# Patient Record
Sex: FEMALE | Race: WHITE | NOT HISPANIC OR LATINO | Employment: OTHER | URBAN - METROPOLITAN AREA
[De-identification: names, ages, dates, MRNs, and addresses within clinical notes are randomized per-mention and may not be internally consistent; named-entity substitution may affect disease eponyms.]

---

## 2021-09-02 LAB — RETINOPATHY: NORMAL

## 2021-12-12 ENCOUNTER — TRANSFERRED RECORDS (OUTPATIENT)
Dept: HEALTH INFORMATION MANAGEMENT | Facility: CLINIC | Age: 86
End: 2021-12-12

## 2022-01-03 ENCOUNTER — TRANSFERRED RECORDS (OUTPATIENT)
Dept: HEALTH INFORMATION MANAGEMENT | Facility: CLINIC | Age: 87
End: 2022-01-03

## 2022-04-07 ENCOUNTER — TRANSFERRED RECORDS (OUTPATIENT)
Dept: HEALTH INFORMATION MANAGEMENT | Facility: CLINIC | Age: 87
End: 2022-04-07

## 2022-04-07 LAB
ALT SERPL-CCNC: 50 U/L (ref 6–29)
AST SERPL-CCNC: 28 U/L (ref 10–35)
CHOLESTEROL (EXTERNAL): 168 MG/DL
CREATININE (EXTERNAL): 1.24 MG/DL (ref 0.6–0.88)
GFR ESTIMATED (EXTERNAL): 39 ML/MIN/1.73M2
GFR ESTIMATED (IF AFRICAN AMERICAN) (EXTERNAL): 46 ML/MIN/1.73M2
GLUCOSE (EXTERNAL): 155 MG/DL (ref 65–99)
HDLC SERPL-MCNC: 59 MG/DL
LDL CHOLESTEROL (EXTERNAL): 90 MG/DL
NON HDL CHOLESTEROL (EXTERNAL): 109 MG/DL
POTASSIUM (EXTERNAL): 4.9 MMOL/L (ref 3.5–5.3)
TRIGLYCERIDES (EXTERNAL): 94 MG/DL

## 2022-07-19 ENCOUNTER — TRANSFERRED RECORDS (OUTPATIENT)
Dept: HEALTH INFORMATION MANAGEMENT | Facility: CLINIC | Age: 87
End: 2022-07-19

## 2022-10-20 ENCOUNTER — TRANSFERRED RECORDS (OUTPATIENT)
Dept: HEALTH INFORMATION MANAGEMENT | Facility: CLINIC | Age: 87
End: 2022-10-20

## 2022-10-26 ENCOUNTER — OFFICE VISIT (OUTPATIENT)
Dept: FAMILY MEDICINE | Facility: CLINIC | Age: 87
End: 2022-10-26
Payer: COMMERCIAL

## 2022-10-26 VITALS
SYSTOLIC BLOOD PRESSURE: 151 MMHG | DIASTOLIC BLOOD PRESSURE: 72 MMHG | HEART RATE: 73 BPM | WEIGHT: 112 LBS | OXYGEN SATURATION: 97 % | RESPIRATION RATE: 14 BRPM

## 2022-10-26 DIAGNOSIS — E11.65 TYPE 2 DIABETES MELLITUS WITH HYPERGLYCEMIA, WITHOUT LONG-TERM CURRENT USE OF INSULIN (H): ICD-10-CM

## 2022-10-26 DIAGNOSIS — I65.23 BILATERAL CAROTID ARTERY STENOSIS: Primary | ICD-10-CM

## 2022-10-26 DIAGNOSIS — F41.1 GENERALIZED ANXIETY DISORDER: ICD-10-CM

## 2022-10-26 DIAGNOSIS — I50.9 CHRONIC CONGESTIVE HEART FAILURE, UNSPECIFIED HEART FAILURE TYPE (H): ICD-10-CM

## 2022-10-26 PROBLEM — F41.9 ANXIETY DISORDER: Status: ACTIVE | Noted: 2022-01-31

## 2022-10-26 PROBLEM — I10 ESSENTIAL HYPERTENSION: Status: ACTIVE | Noted: 2022-10-26

## 2022-10-26 PROCEDURE — 99204 OFFICE O/P NEW MOD 45 MIN: CPT | Performed by: INTERNAL MEDICINE

## 2022-10-26 RX ORDER — LISINOPRIL 40 MG/1
40 TABLET ORAL DAILY
Qty: 90 TABLET | Refills: 1 | Status: SHIPPED | OUTPATIENT
Start: 2022-10-26 | End: 2022-12-12

## 2022-10-26 RX ORDER — METOPROLOL SUCCINATE 50 MG/1
TABLET, EXTENDED RELEASE ORAL
COMMUNITY
Start: 2021-05-05

## 2022-10-26 RX ORDER — LIRAGLUTIDE 6 MG/ML
1.2 INJECTION SUBCUTANEOUS DAILY
COMMUNITY
Start: 2022-10-26

## 2022-10-26 RX ORDER — ATORVASTATIN CALCIUM 10 MG/1
TABLET, FILM COATED ORAL
COMMUNITY
Start: 2022-09-20 | End: 2022-10-26

## 2022-10-26 RX ORDER — LISINOPRIL 40 MG/1
TABLET ORAL
COMMUNITY
End: 2022-10-26

## 2022-10-26 RX ORDER — SERTRALINE HYDROCHLORIDE 25 MG/1
TABLET, FILM COATED ORAL
COMMUNITY
End: 2022-10-26

## 2022-10-26 RX ORDER — ATORVASTATIN CALCIUM 20 MG/1
20 TABLET, FILM COATED ORAL AT BEDTIME
Qty: 90 TABLET | Refills: 1 | Status: SHIPPED | OUTPATIENT
Start: 2022-10-26 | End: 2023-08-14

## 2022-10-26 RX ORDER — LIRAGLUTIDE 6 MG/ML
INJECTION SUBCUTANEOUS
COMMUNITY
Start: 2022-10-20 | End: 2022-10-26

## 2022-10-26 RX ORDER — SERTRALINE HYDROCHLORIDE 25 MG/1
25 TABLET, FILM COATED ORAL DAILY
Qty: 90 TABLET | Refills: 1 | Status: SHIPPED | OUTPATIENT
Start: 2022-10-26 | End: 2023-05-22

## 2022-10-26 RX ORDER — CLOPIDOGREL BISULFATE 75 MG/1
TABLET ORAL
COMMUNITY
Start: 2021-06-04

## 2022-10-26 RX ORDER — LATANOPROST 50 UG/ML
SOLUTION/ DROPS OPHTHALMIC
COMMUNITY
Start: 2022-10-08

## 2022-10-26 ASSESSMENT — PAIN SCALES - GENERAL: PAINLEVEL: NO PAIN (0)

## 2022-10-26 NOTE — PATIENT INSTRUCTIONS
Carotid Ultrasound:    We have ordered an imaging study.  Unless otherwise indicated, please call the number below to schedule.   [facility] Scheduling:    [phone]    Results will be conveyed by MyChart, telephone or letter.

## 2022-10-26 NOTE — PROGRESS NOTES
Assessment & Plan     Bilateral carotid artery stenosis  She had L CEA in ? Does not recall any surveillance thereafter.  We will obtain an ultrasound.  - US Carotid Bilateral  - atorvastatin (LIPITOR) 20 MG tablet  Dispense: 90 tablet; Refill: 1    Type 2 diabetes mellitus with hyperglycemia, without long-term current use of insulin (H)  She is on a regimen of Victoza and Jardiance.  She had been on metformin and glimepiride at some point.  After discussion we understood that she actually has recently established with a local endocrinologist.  Therefore no further lab or testing was pursued.    Chronic congestive heart failure, unspecified heart failure type (H)  It sounds like she may have been in the hospital with a CHF exacerbation in the recent past.  We requested records.  The patient already has a cardiology appointment scheduled for December with an outside cardiologist.  - lisinopril (ZESTRIL) 40 MG tablet  Dispense: 90 tablet; Refill: 1    Generalized anxiety disorder  Relatively low-dose medication.  Refilled.  - sertraline (ZOLOFT) 25 MG tablet  Dispense: 90 tablet; Refill: 1    She had for some reason been on a 20 mg +10 mg atorvastatin.  I suggested 20 mg alone.  She is comfortable with this.    52 minutes spent on the date of the encounter doing chart review, patient visit, documentation and discussion with family            No follow-ups on file.    Chavez Juarez MD  [facility] DAIANA Galindo is a 86 year old accompanied by her daughter, presenting for the following health issues:  Establish Care and Medication Refill      HPI   New to me as well as the clinic.  Relocating from [place] where she had been living alone for many years.  Her   in the late .      DM:  Dx in her 50s.  She is currently on Jardiance and Victoza  She had been on Metformin for many years, became ineffective.    She has recently established with [facility]    CHF:  Dx in 2020.  Was in a [place] hospital with exacerbation.  Takes lisinopril, lasix and metoprolol    Anxiety:  On Sertraline.  Takes daily.        Review of Systems         Objective    BP (!) 151/72 (BP Location: Right arm, Patient Position: Sitting, Cuff Size: Adult Regular)   Pulse 73   Resp 14   Wt 50.8 kg (112 lb)   SpO2 97%   There is no height or weight on file to calculate BMI.  Physical Exam   GENERAL: healthy, alert and no distress  NECK: no adenopathy, no asymmetry, masses, or scars and thyroid normal to palpation  RESP: lungs clear to auscultation - no rales, rhonchi or wheezes  CV: regular rate and rhythm, normal S1 S2, no S3 or S4, no murmur, click or rub, no peripheral edema and peripheral pulses strong  ABDOMEN: soft, nontender, no hepatosplenomegaly, no masses and bowel sounds normal  MS: no gross musculoskeletal defects noted, no edema

## 2022-11-17 ENCOUNTER — HOSPITAL ENCOUNTER (OUTPATIENT)
Dept: ULTRASOUND IMAGING | Facility: CLINIC | Age: 87
Discharge: HOME | End: 2022-11-17
Attending: INTERNAL MEDICINE | Admitting: INTERNAL MEDICINE
Payer: COMMERCIAL

## 2022-11-17 DIAGNOSIS — I65.23 BILATERAL CAROTID ARTERY STENOSIS: ICD-10-CM

## 2022-11-17 PROCEDURE — 93880 EXTRACRANIAL BILAT STUDY: CPT

## 2022-12-12 DIAGNOSIS — I50.9 CHRONIC CONGESTIVE HEART FAILURE, UNSPECIFIED HEART FAILURE TYPE (H): ICD-10-CM

## 2022-12-12 RX ORDER — LISINOPRIL 40 MG/1
40 TABLET ORAL DAILY
Qty: 90 TABLET | Refills: 1 | Status: SHIPPED | OUTPATIENT
Start: 2022-12-12 | End: 2023-06-01

## 2022-12-12 NOTE — TELEPHONE ENCOUNTER
Medication Question or Refill    Contacts       Type Contact Phone/Fax    12/12/2022 10:31 AM CST Phone (Incoming) Josie Hanna (Self) [phone] (H)          What medication are you calling about (include dose and sig)?: lisinopril (ZESTRIL) 40 MG tablet    Controlled Substance Agreement on file:   CSA -- Patient Level:    CSA: None found at the patient level.       Who prescribed the medication?: Patni    Do you need a refill?Yes - pt has enough to get through to the holiday's but wants a year supply sent    When did you use the medication last? today    Patient offered an appointment? No    Do you have any questions or concerns?  No    Preferred Pharmacy:  [id number] - [facility] [address] AT [facility] [address]  [address]  [facility] [address]  Phone: [phone] Fax: [phone]    Kelly Hanna/Sean-  [facility]

## 2023-01-24 ENCOUNTER — TRANSFERRED RECORDS (OUTPATIENT)
Dept: HEALTH INFORMATION MANAGEMENT | Facility: CLINIC | Age: 88
End: 2023-01-24

## 2023-05-01 ENCOUNTER — TRANSFERRED RECORDS (OUTPATIENT)
Dept: HEALTH INFORMATION MANAGEMENT | Facility: CLINIC | Age: 88
End: 2023-05-01
Payer: COMMERCIAL

## 2023-05-22 DIAGNOSIS — F41.1 GENERALIZED ANXIETY DISORDER: ICD-10-CM

## 2023-05-22 RX ORDER — SERTRALINE HYDROCHLORIDE 25 MG/1
25 TABLET, FILM COATED ORAL DAILY
Qty: 90 TABLET | Refills: 1 | Status: SHIPPED | OUTPATIENT
Start: 2023-05-22 | End: 2023-12-20

## 2023-05-22 NOTE — TELEPHONE ENCOUNTER
Patient [place] with her [place] and needs to  on Thursday as they are leaving on Friday.    Sertraline 25mg tablets    To [facility] :  [id number] - [place], [place] - [address] AT [facility] [address]

## 2023-05-31 DIAGNOSIS — I50.9 CHRONIC CONGESTIVE HEART FAILURE, UNSPECIFIED HEART FAILURE TYPE (H): ICD-10-CM

## 2023-06-01 RX ORDER — LISINOPRIL 40 MG/1
40 TABLET ORAL DAILY
Qty: 90 TABLET | Refills: 1 | Status: SHIPPED | OUTPATIENT
Start: 2023-06-01 | End: 2024-05-23

## 2023-08-14 DIAGNOSIS — I65.23 BILATERAL CAROTID ARTERY STENOSIS: ICD-10-CM

## 2023-08-15 RX ORDER — ATORVASTATIN CALCIUM 20 MG/1
20 TABLET, FILM COATED ORAL AT BEDTIME
Qty: 90 TABLET | Refills: 1 | Status: SHIPPED | OUTPATIENT
Start: 2023-08-15 | End: 2023-11-13

## 2023-10-02 ENCOUNTER — TRANSFERRED RECORDS (OUTPATIENT)
Dept: HEALTH INFORMATION MANAGEMENT | Facility: CLINIC | Age: 88
End: 2023-10-02
Payer: COMMERCIAL

## 2023-11-12 DIAGNOSIS — I65.23 BILATERAL CAROTID ARTERY STENOSIS: ICD-10-CM

## 2023-11-13 RX ORDER — ATORVASTATIN CALCIUM 20 MG/1
20 TABLET, FILM COATED ORAL AT BEDTIME
Qty: 60 TABLET | Refills: 0 | Status: SHIPPED | OUTPATIENT
Start: 2023-11-13 | End: 2024-05-06

## 2023-12-19 DIAGNOSIS — F41.1 GENERALIZED ANXIETY DISORDER: ICD-10-CM

## 2023-12-20 RX ORDER — SERTRALINE HYDROCHLORIDE 25 MG/1
25 TABLET, FILM COATED ORAL DAILY
Qty: 90 TABLET | Refills: 1 | Status: SHIPPED | OUTPATIENT
Start: 2023-12-20 | End: 2024-06-10

## 2024-02-04 ENCOUNTER — TRANSFERRED RECORDS (OUTPATIENT)
Dept: HEALTH INFORMATION MANAGEMENT | Facility: CLINIC | Age: 89
End: 2024-02-04
Payer: COMMERCIAL

## 2024-02-05 ENCOUNTER — TRANSFERRED RECORDS (OUTPATIENT)
Dept: HEALTH INFORMATION MANAGEMENT | Facility: CLINIC | Age: 89
End: 2024-02-05
Payer: COMMERCIAL

## 2024-02-05 LAB
ALBUMIN (URINE) MG/SPEC: 3.2 UG/ML
ALBUMIN/CREATININE RATIO: 8 MG/G CREAT (ref 0–29)
CREATININE (EXTERNAL): 0.83 MG/DL (ref 0.52–1.04)
CREATININE (URINE): 38 MG/DL
GFR ESTIMATED (EXTERNAL): >60 ML/MIN/1.7
GLUCOSE (EXTERNAL): 200 MG/DL (ref 70–99)
HBA1C MFR BLD: 7.5 %
POTASSIUM (EXTERNAL): 4.6 MMOL/L (ref 3.5–5.1)

## 2024-05-06 DIAGNOSIS — I65.23 BILATERAL CAROTID ARTERY STENOSIS: ICD-10-CM

## 2024-05-06 RX ORDER — ATORVASTATIN CALCIUM 20 MG/1
20 TABLET, FILM COATED ORAL AT BEDTIME
Qty: 60 TABLET | Refills: 0 | Status: SHIPPED | OUTPATIENT
Start: 2024-05-06 | End: 2024-06-10

## 2024-05-09 NOTE — TELEPHONE ENCOUNTER
Spoke  to pt regarding office follow up for refill /annual in person (July) prior to current supply of approved refill of 2 month supply.    Pt will call back to schedule after talking with daughter to get  transportation arranged prior to scheduling     Christopher Anderson CMA on 5/9/2024 at 2:39 PM

## 2024-05-14 ENCOUNTER — TELEPHONE (OUTPATIENT)
Dept: FAMILY MEDICINE | Facility: CLINIC | Age: 89
End: 2024-05-14
Payer: COMMERCIAL

## 2024-05-14 NOTE — TELEPHONE ENCOUNTER
Reason for Call:  Appointment Request    Patient requesting this type of appt: Chronic Diease Management/Medication/Follow-Up    Requested provider: Chavez Juarez    Reason patient unable to be scheduled: Not within requested timeframe    When does patient want to be seen/preferred time:  Patient has a ride available on the following dates:  Yin 10, 11 or 21st.  Can she be fit into [facility] schedule on one of those days?    Comments:     Okay to leave a detailed message?: Yes at Home number on file [phone] (home)    Call taken on 5/14/2024 at 2:17 PM by Kristin Milian

## 2024-05-23 DIAGNOSIS — I50.9 CHRONIC CONGESTIVE HEART FAILURE, UNSPECIFIED HEART FAILURE TYPE (H): ICD-10-CM

## 2024-05-23 RX ORDER — LISINOPRIL 40 MG/1
40 TABLET ORAL DAILY
Qty: 90 TABLET | Refills: 1 | Status: SHIPPED | OUTPATIENT
Start: 2024-05-23

## 2024-06-10 ENCOUNTER — OFFICE VISIT (OUTPATIENT)
Dept: FAMILY MEDICINE | Facility: CLINIC | Age: 89
End: 2024-06-10
Payer: COMMERCIAL

## 2024-06-10 VITALS
SYSTOLIC BLOOD PRESSURE: 138 MMHG | WEIGHT: 111.6 LBS | OXYGEN SATURATION: 100 % | TEMPERATURE: 97.1 F | RESPIRATION RATE: 15 BRPM | DIASTOLIC BLOOD PRESSURE: 86 MMHG | HEIGHT: 64 IN | HEART RATE: 70 BPM | BODY MASS INDEX: 19.05 KG/M2

## 2024-06-10 DIAGNOSIS — F41.1 GENERALIZED ANXIETY DISORDER: ICD-10-CM

## 2024-06-10 DIAGNOSIS — C91.11 CHRONIC LYMPHOID LEUKEMIA IN REMISSION (H): ICD-10-CM

## 2024-06-10 DIAGNOSIS — E11.65 TYPE 2 DIABETES MELLITUS WITH HYPERGLYCEMIA, WITHOUT LONG-TERM CURRENT USE OF INSULIN (H): Primary | ICD-10-CM

## 2024-06-10 DIAGNOSIS — I48.92 ATRIAL FLUTTER, UNSPECIFIED TYPE (H): ICD-10-CM

## 2024-06-10 DIAGNOSIS — I50.32 CHRONIC DIASTOLIC CONGESTIVE HEART FAILURE (H): ICD-10-CM

## 2024-06-10 DIAGNOSIS — L40.9 PSORIASIS: ICD-10-CM

## 2024-06-10 DIAGNOSIS — I65.23 BILATERAL CAROTID ARTERY STENOSIS: ICD-10-CM

## 2024-06-10 DIAGNOSIS — N18.31 STAGE 3A CHRONIC KIDNEY DISEASE (H): ICD-10-CM

## 2024-06-10 PROCEDURE — 99214 OFFICE O/P EST MOD 30 MIN: CPT | Performed by: INTERNAL MEDICINE

## 2024-06-10 PROCEDURE — 80048 BASIC METABOLIC PNL TOTAL CA: CPT | Performed by: INTERNAL MEDICINE

## 2024-06-10 PROCEDURE — 36415 COLL VENOUS BLD VENIPUNCTURE: CPT | Performed by: INTERNAL MEDICINE

## 2024-06-10 PROCEDURE — 84443 ASSAY THYROID STIM HORMONE: CPT | Performed by: INTERNAL MEDICINE

## 2024-06-10 PROCEDURE — 84439 ASSAY OF FREE THYROXINE: CPT | Performed by: INTERNAL MEDICINE

## 2024-06-10 RX ORDER — ATORVASTATIN CALCIUM 20 MG/1
20 TABLET, FILM COATED ORAL AT BEDTIME
Qty: 90 TABLET | Refills: 3 | Status: SHIPPED | OUTPATIENT
Start: 2024-06-10

## 2024-06-10 RX ORDER — FOLIC ACID 1 MG/1
1 TABLET ORAL DAILY
COMMUNITY
Start: 2024-03-13 | End: 2025-03-13

## 2024-06-10 RX ORDER — SOTALOL HYDROCHLORIDE 80 MG/1
1 TABLET ORAL DAILY
COMMUNITY
Start: 2024-02-29

## 2024-06-10 RX ORDER — HYDRALAZINE HYDROCHLORIDE 25 MG/1
TABLET, FILM COATED ORAL
COMMUNITY
Start: 2022-09-29

## 2024-06-10 RX ORDER — CLOBETASOL PROPIONATE 0.5 MG/G
CREAM TOPICAL 2 TIMES DAILY
Qty: 60 G | Refills: 0 | Status: SHIPPED | OUTPATIENT
Start: 2024-06-10 | End: 2024-07-26

## 2024-06-10 RX ORDER — SERTRALINE HYDROCHLORIDE 25 MG/1
25 TABLET, FILM COATED ORAL DAILY
Qty: 90 TABLET | Refills: 3 | Status: SHIPPED | OUTPATIENT
Start: 2024-06-10

## 2024-06-10 ASSESSMENT — PAIN SCALES - GENERAL: PAINLEVEL: NO PAIN (0)

## 2024-06-10 NOTE — LETTER
June 11, 2024      Josie Hanna  [address] [place] [id number]        Josie,       Labs are stable.  Thyroid is flagged as SLIGHTLY off. This is likely inconsequential.  We will plan to check again in the future for stability.       Chavez Juarez MD     Resulted Orders   TSH WITH FREE T4 REFLEX   Result Value Ref Range    TSH 4.78 (H) 0.30 - 4.20 uIU/mL   Basic metabolic panel  (Ca, Cl, CO2, Creat, Gluc, K, Na, BUN)   Result Value Ref Range    Sodium 139 135 - 145 mmol/L      Comment:      Reference intervals for this test were updated on 09/26/2023 to more accurately reflect our healthy population. There may be differences in the flagging of prior results with similar values performed with this method. Interpretation of those prior results can be made in the context of the updated reference intervals.     Potassium 4.8 3.4 - 5.3 mmol/L    Chloride 101 98 - 107 mmol/L    Carbon Dioxide (CO2) 28 22 - 29 mmol/L    Anion Gap 10 7 - 15 mmol/L    Urea Nitrogen 19.0 8.0 - 23.0 mg/dL    Creatinine 0.86 0.51 - 0.95 mg/dL    GFR Estimate 65 >60 mL/min/1.73m2    Calcium 10.2 8.8 - 10.2 mg/dL    Glucose 122 (H) 70 - 99 mg/dL   T4 free   Result Value Ref Range    Free T4 1.08 0.90 - 1.70 ng/dL

## 2024-06-10 NOTE — PROGRESS NOTES
Assessment & Plan     Type 2 diabetes mellitus with hyperglycemia, without long-term current use of insulin (H)  Managed by outside endocrine.  Has an appointment there this week.  Medications are noted.  - TSH WITH FREE T4 REFLEX  - Basic metabolic panel  (Ca, Cl, CO2, Creat, Gluc, K, Na, BUN)    Generalized anxiety disorder  Stable on sertraline.  Refilled today.  No ill side effects.  - sertraline (ZOLOFT) 25 MG tablet  Dispense: 90 tablet; Refill: 3    Chronic diastolic congestive heart failure (H)  Followed by [facility].  Medications noted.  Recently seen there.  Clinic note is reviewed.    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  Followed by outside oncology.    Atrial flutter, unspecified type (H)  Had a cardioversion last November.  Notes are reviewed.  Thyroid.  - TSH WITH FREE T4 REFLEX    Stage 3a chronic kidney disease (H)  Avoid chronic nephrotoxins.  Labs ordered.    Bilateral carotid artery stenosis  Has been on a statin for a number of years.  Stable.  Given her heart history should continue.  Medication is refilled.  - atorvastatin (LIPITOR) 20 MG tablet  Dispense: 90 tablet; Refill: 3    Psoriasis  She has 3 patches of what appears to be psoriasis on her right lower extremity.  Will prescribe clobetasol.  Sunlight exposure is recommended.  Can refer to dermatology if indicated.  - clobetasol propionate (TEMOVATE) 0.05 % external cream  Dispense: 60 g; Refill: 0            Subjective   Josie is a 88 year old, presenting for the following health issues:  Follow Up and Health Maintenance (Eye Exam- Has one scheduled for tomorrow. )    Pt with MMP including HFpEF, DM2, CKD, PAD, HTN and A fib.    She [facility] including Cardiology, Endo and Heme/onc.      Last seen here over a year ago    She does have psoriasis.  Saw a dermatologist in [place].  Was on cream.  Was advised light therapy.  This was some years ago.   "  Has 3 patches RLE.  Now using OTC cortisone only.      History of Present Illness       Heart Failure:  She presents for follow up of heart failure. She is not experiencing shortness of breath at night, with rest or with activity  She is not experiencing any lower extremity edema.   She denies orthopenea and is not coughing at night. Patient is checking weight daily. She has recently had a None.  She has no side effects from medications.  She has had no other medical visits for heart failure since the last visit.    She eats 2-3 servings of fruits and vegetables daily.She consumes 1 sweetened beverage(s) daily.She exercises with enough effort to increase her heart rate 9 or less minutes per day.  She exercises with enough effort to increase her heart rate 3 or less days per week.   She is taking medications regularly.     Last Echo: No results found.                Objective    /86   Pulse 70   Temp 97.1  F (36.2  C) (Tympanic)   Resp 15   Ht 1.626 m (5' 4\")   Wt 50.6 kg (111 lb 9.6 oz)   SpO2 100%   BMI 19.16 kg/m    Body mass index is 19.16 kg/m .  Physical Exam   GEN NAD  ENT MMM  PSYCH alert pleasant and cooperative  EXT:  3 rough round patches RLE.  C/w psoriasis.              Signed Electronically by: Chavez Juarez MD    "

## 2024-06-11 LAB
ANION GAP SERPL CALCULATED.3IONS-SCNC: 10 MMOL/L (ref 7–15)
BUN SERPL-MCNC: 19 MG/DL (ref 8–23)
CALCIUM SERPL-MCNC: 10.2 MG/DL (ref 8.8–10.2)
CHLORIDE SERPL-SCNC: 101 MMOL/L (ref 98–107)
CREAT SERPL-MCNC: 0.86 MG/DL (ref 0.51–0.95)
DEPRECATED HCO3 PLAS-SCNC: 28 MMOL/L (ref 22–29)
EGFRCR SERPLBLD CKD-EPI 2021: 65 ML/MIN/1.73M2
GLUCOSE SERPL-MCNC: 122 MG/DL (ref 70–99)
POTASSIUM SERPL-SCNC: 4.8 MMOL/L (ref 3.4–5.3)
SODIUM SERPL-SCNC: 139 MMOL/L (ref 135–145)
T4 FREE SERPL-MCNC: 1.08 NG/DL (ref 0.9–1.7)
TSH SERPL DL<=0.005 MIU/L-ACNC: 4.78 UIU/ML (ref 0.3–4.2)

## 2024-06-13 ENCOUNTER — TRANSFERRED RECORDS (OUTPATIENT)
Dept: HEALTH INFORMATION MANAGEMENT | Facility: CLINIC | Age: 89
End: 2024-06-13
Payer: COMMERCIAL

## 2024-09-03 DIAGNOSIS — L40.9 PSORIASIS: ICD-10-CM

## 2024-09-03 RX ORDER — CLOBETASOL PROPIONATE 0.5 MG/G
CREAM TOPICAL
Qty: 60 G | Refills: 0 | Status: SHIPPED | OUTPATIENT
Start: 2024-09-03

## 2024-10-08 ENCOUNTER — TRANSFERRED RECORDS (OUTPATIENT)
Dept: FAMILY MEDICINE | Facility: CLINIC | Age: 89
End: 2024-10-08
Payer: COMMERCIAL

## 2024-11-12 DIAGNOSIS — I50.9 CHRONIC CONGESTIVE HEART FAILURE, UNSPECIFIED HEART FAILURE TYPE (H): ICD-10-CM

## 2024-11-12 RX ORDER — LISINOPRIL 40 MG/1
40 TABLET ORAL DAILY
Qty: 90 TABLET | Refills: 1 | Status: SHIPPED | OUTPATIENT
Start: 2024-11-12

## 2025-01-26 ENCOUNTER — OFFICE VISIT (OUTPATIENT)
Dept: URGENT CARE | Facility: URGENT CARE | Age: OVER 89
End: 2025-01-26
Payer: COMMERCIAL

## 2025-01-26 ENCOUNTER — ANCILLARY PROCEDURE (OUTPATIENT)
Dept: GENERAL RADIOLOGY | Facility: CLINIC | Age: OVER 89
End: 2025-01-26
Attending: INTERNAL MEDICINE
Payer: COMMERCIAL

## 2025-01-26 VITALS
DIASTOLIC BLOOD PRESSURE: 78 MMHG | WEIGHT: 109.6 LBS | SYSTOLIC BLOOD PRESSURE: 182 MMHG | RESPIRATION RATE: 18 BRPM | OXYGEN SATURATION: 97 % | TEMPERATURE: 97.5 F | HEART RATE: 70 BPM

## 2025-01-26 DIAGNOSIS — J34.89 RHINORRHEA: ICD-10-CM

## 2025-01-26 DIAGNOSIS — R06.02 SHORTNESS OF BREATH: Primary | ICD-10-CM

## 2025-01-26 DIAGNOSIS — I10 ESSENTIAL HYPERTENSION, BENIGN: ICD-10-CM

## 2025-01-26 DIAGNOSIS — R06.02 SHORTNESS OF BREATH: ICD-10-CM

## 2025-01-26 LAB
FLUAV AG SPEC QL IA: NEGATIVE
FLUBV AG SPEC QL IA: NEGATIVE

## 2025-01-26 PROCEDURE — 99204 OFFICE O/P NEW MOD 45 MIN: CPT | Performed by: INTERNAL MEDICINE

## 2025-01-26 PROCEDURE — 71046 X-RAY EXAM CHEST 2 VIEWS: CPT | Mod: TC | Performed by: RADIOLOGY

## 2025-01-26 PROCEDURE — 87804 INFLUENZA ASSAY W/OPTIC: CPT | Performed by: INTERNAL MEDICINE

## 2025-01-26 RX ORDER — IPRATROPIUM BROMIDE 21 UG/1
2 SPRAY, METERED NASAL EVERY 8 HOURS PRN
Qty: 30 ML | Refills: 0 | Status: SHIPPED | OUTPATIENT
Start: 2025-01-26

## 2025-01-26 RX ORDER — LANCETS 30 GAUGE
EACH MISCELLANEOUS 2 TIMES DAILY
COMMUNITY
Start: 2025-01-21

## 2025-01-26 RX ORDER — SERTRALINE HYDROCHLORIDE 25 MG/1
TABLET, FILM COATED ORAL
COMMUNITY
Start: 2024-12-03

## 2025-01-26 RX ORDER — POTASSIUM CHLORIDE 750 MG/1
10 CAPSULE, EXTENDED RELEASE ORAL DAILY
COMMUNITY
Start: 2024-12-28

## 2025-01-26 RX ORDER — EMPAGLIFLOZIN 25 MG/1
1 TABLET, FILM COATED ORAL
COMMUNITY
Start: 2024-11-11

## 2025-01-26 RX ORDER — PEN NEEDLE, DIABETIC 32GX 5/32"
NEEDLE, DISPOSABLE MISCELLANEOUS
COMMUNITY
Start: 2025-01-20

## 2025-01-26 RX ORDER — CLOBETASOL PROPIONATE 0.5 MG/G
CREAM TOPICAL
COMMUNITY
Start: 2024-09-03

## 2025-01-26 RX ORDER — BLOOD SUGAR DIAGNOSTIC
STRIP MISCELLANEOUS
COMMUNITY
Start: 2024-12-13

## 2025-01-26 RX ORDER — FOLIC ACID 1 MG/1
TABLET ORAL
COMMUNITY
Start: 2024-12-03

## 2025-01-26 RX ORDER — LISINOPRIL 40 MG/1
TABLET ORAL
COMMUNITY
Start: 2024-11-13

## 2025-01-26 RX ORDER — BETAMETHASONE DIPROPIONATE 0.5 MG/G
CREAM TOPICAL 2 TIMES DAILY
COMMUNITY
Start: 2025-01-03

## 2025-01-26 RX ORDER — ATORVASTATIN CALCIUM 20 MG/1
TABLET, FILM COATED ORAL
COMMUNITY
Start: 2024-12-28

## 2025-01-26 RX ORDER — METFORMIN HYDROCHLORIDE 500 MG/1
1 TABLET, EXTENDED RELEASE ORAL
COMMUNITY
Start: 2025-01-04

## 2025-01-26 RX ORDER — APIXABAN 2.5 MG/1
TABLET, FILM COATED ORAL
COMMUNITY
Start: 2025-01-14

## 2025-01-26 RX ORDER — LATANOPROST 50 UG/ML
SOLUTION/ DROPS OPHTHALMIC
COMMUNITY
Start: 2024-12-28

## 2025-01-26 RX ORDER — SOTALOL HYDROCHLORIDE 80 MG/1
TABLET ORAL
COMMUNITY
Start: 2025-01-12

## 2025-01-26 RX ORDER — LIRAGLUTIDE 6 MG/ML
INJECTION SUBCUTANEOUS
COMMUNITY
Start: 2024-10-10

## 2025-01-26 RX ORDER — TRIAMCINOLONE ACETONIDE 1 MG/G
OINTMENT TOPICAL
COMMUNITY
Start: 2025-01-15

## 2025-01-26 NOTE — PATIENT INSTRUCTIONS
Chest x-ray is looking good -- no pneumonia.  We will try some ipratropium nasal spray to see if this can reduce the runniness from the nose and also help the skin on the lip to heal up.  Can use some 1% hydrocortisone cream up to twice per day as needed for the skin irritation.     Keep an eye on the blood pressure as you normally do. If you are consistently getting readings over 160 on the top or over 100 on the bottom in this coming week then I recommend calling your cardiologist to determine if you need to adjust medications from that perspective.

## 2025-01-26 NOTE — PROGRESS NOTES
SUBJECTIVE:  Chief complaint of respiratory symptoms.  Started with wheeze two days ago.  Now less wheeze and more hoarse voice and runny nose.  Getting some skin irritation of the upper lip due to this significant rhinorrhea.  Feeling some chest tightness. Denies sore throat.  Denies fevers/chills/sweats.  Home BP this AM was > 150 systolic. Usual BP is running 130-140 systolic.     PMH: atrial fib, HTN, DM. Psoriasis    ROS:  The following systems have been completely reviewed and are negative except as noted in the HPI: CONSTITUTIONAL, HEAD AND NECK, CARDIOVASCULAR, PULMONARY, and RENAL    OBJECTIVE:  BP (!) 182/78   Pulse 70   Temp 97.5  F (36.4  C)   Resp 18   Wt 49.7 kg (109 lb 9.6 oz)   LMP  (LMP Unknown)   SpO2 97%   GENERAL: alert and interactive  HENT: ear canals and TM's normal and cobblestoning of the posterior pharynx with post-nasal drainage   NECK: no adenopathy, no asymmetry, masses, or scars and thyroid normal to palpation  RESP: clear to auscultation and percussion bilaterally; normal I:E ratio  CV: regular rates and rhythm, normal S1 S2, no S3 or S4 and no murmur, click or rub -  EXT: no cyanosis, clubbing or edema; peripheral pulses are brisk and symmetric in the radials, dorsalis pedis and posterior tibials bilaterally  SKIN: eczematous papules on the upper lip and tip of the nose    LAB:  Results for orders placed or performed in visit on 01/26/25   Influenza A & B Antigen - Clinic Collect     Status: Normal    Specimen: Nose; Swab   Result Value Ref Range    Influenza A antigen Negative Negative    Influenza B antigen Negative Negative    Narrative    Test results must be correlated with clinical data. If necessary, results should be confirmed by a molecular assay or viral culture.     CXR, which I have personally reviewed and interpreted, is free of acute infiltrate or effusion.  Normal cardiac silhouette.    ASSESSMENT/PLAN:    ICD-10-CM    1. Shortness of breath  R06.02 Influenza A &  B Antigen - Clinic Collect     XR Chest 2 Views     Reassurance to the patient that all is looking good clinically today without evidence of pneumonia or any cardiac decompensation.  The upper airway wheeze that she noted could all just be transient laryngotracheitis related to viral URI.  Nothing that would require specific intervention other than symptomatic efforts to reduce rhinorrhea and post nasal drip.      2. Rhinorrhea  J34.89 ipratropium (ATROVENT) 0.03 % nasal spray      3. Essential hypertension, benign  I10      Patient instructed to continue home BP monitoring.  The current elevated BP could be part of a response to acute illness which could re-balance however if she is persisting with readings > 160/100 over the coming week then she should contact her cardiologist about medication adjustments.          Darshan Terry MD

## 2025-02-11 ENCOUNTER — TRANSFERRED RECORDS (OUTPATIENT)
Dept: HEALTH INFORMATION MANAGEMENT | Facility: CLINIC | Age: OVER 89
End: 2025-02-11
Payer: COMMERCIAL

## 2025-03-15 DIAGNOSIS — F41.1 GENERALIZED ANXIETY DISORDER: ICD-10-CM

## 2025-03-17 RX ORDER — SERTRALINE HYDROCHLORIDE 25 MG/1
25 TABLET, FILM COATED ORAL DAILY
Qty: 90 TABLET | Refills: 1 | Status: SHIPPED | OUTPATIENT
Start: 2025-03-17

## 2025-03-21 ENCOUNTER — TRANSFERRED RECORDS (OUTPATIENT)
Dept: HEALTH INFORMATION MANAGEMENT | Facility: CLINIC | Age: OVER 89
End: 2025-03-21
Payer: COMMERCIAL

## 2025-05-11 DIAGNOSIS — I50.9 CHRONIC CONGESTIVE HEART FAILURE, UNSPECIFIED HEART FAILURE TYPE (H): ICD-10-CM

## 2025-05-12 RX ORDER — LISINOPRIL 40 MG/1
40 TABLET ORAL DAILY
Qty: 90 TABLET | Refills: 1 | Status: SHIPPED | OUTPATIENT
Start: 2025-05-12

## 2025-08-12 DIAGNOSIS — I50.9 CHRONIC CONGESTIVE HEART FAILURE, UNSPECIFIED HEART FAILURE TYPE (H): ICD-10-CM

## 2025-08-12 RX ORDER — LISINOPRIL 40 MG/1
40 TABLET ORAL DAILY
Qty: 90 TABLET | Refills: 1 | Status: SHIPPED | OUTPATIENT
Start: 2025-08-12

## 2025-08-13 ENCOUNTER — TELEPHONE (OUTPATIENT)
Dept: FAMILY MEDICINE | Facility: CLINIC | Age: OVER 89
End: 2025-08-13
Payer: COMMERCIAL